# Patient Record
Sex: MALE | Race: WHITE
[De-identification: names, ages, dates, MRNs, and addresses within clinical notes are randomized per-mention and may not be internally consistent; named-entity substitution may affect disease eponyms.]

---

## 2019-07-31 ENCOUNTER — TRANSCRIPTION ENCOUNTER (OUTPATIENT)
Age: 44
End: 2019-07-31

## 2020-04-28 ENCOUNTER — TRANSCRIPTION ENCOUNTER (OUTPATIENT)
Age: 45
End: 2020-04-28

## 2020-05-14 ENCOUNTER — TRANSCRIPTION ENCOUNTER (OUTPATIENT)
Age: 45
End: 2020-05-14

## 2021-11-05 ENCOUNTER — APPOINTMENT (OUTPATIENT)
Dept: FAMILY MEDICINE | Facility: CLINIC | Age: 46
End: 2021-11-05
Payer: COMMERCIAL

## 2021-11-05 ENCOUNTER — TRANSCRIPTION ENCOUNTER (OUTPATIENT)
Age: 46
End: 2021-11-05

## 2021-11-05 VITALS
HEART RATE: 79 BPM | WEIGHT: 255 LBS | HEIGHT: 69 IN | OXYGEN SATURATION: 98 % | SYSTOLIC BLOOD PRESSURE: 140 MMHG | TEMPERATURE: 98.2 F | BODY MASS INDEX: 37.77 KG/M2 | DIASTOLIC BLOOD PRESSURE: 94 MMHG

## 2021-11-05 DIAGNOSIS — G47.30 SLEEP APNEA, UNSPECIFIED: ICD-10-CM

## 2021-11-05 DIAGNOSIS — Z78.9 OTHER SPECIFIED HEALTH STATUS: ICD-10-CM

## 2021-11-05 DIAGNOSIS — Z00.00 ENCOUNTER FOR GENERAL ADULT MEDICAL EXAMINATION W/OUT ABNORMAL FINDINGS: ICD-10-CM

## 2021-11-05 DIAGNOSIS — Z23 ENCOUNTER FOR IMMUNIZATION: ICD-10-CM

## 2021-11-05 PROCEDURE — 90686 IIV4 VACC NO PRSV 0.5 ML IM: CPT

## 2021-11-05 PROCEDURE — 99386 PREV VISIT NEW AGE 40-64: CPT | Mod: 25

## 2021-11-05 PROCEDURE — G0008: CPT

## 2021-11-05 NOTE — HEALTH RISK ASSESSMENT
[Excellent] : ~his/her~  mood as  excellent [] : No [No] : No [No falls in past year] : Patient reported no falls in the past year [0] : 2) Feeling down, depressed, or hopeless: Not at all (0) [PHQ-2 Negative - No further assessment needed] : PHQ-2 Negative - No further assessment needed [[id number]] : 0 [With Significant Other] : lives with significant other [Employed] : employed [] :  [# Of Children ___] : has [unfilled] children [Fully functional (bathing, dressing, toileting, transferring, walking, feeding)] : Fully functional (bathing, dressing, toileting, transferring, walking, feeding) [Fully functional (using the telephone, shopping, preparing meals, housekeeping, doing laundry, using] : Fully functional and needs no help or supervision to perform IADLs (using the telephone, shopping, preparing meals, housekeeping, doing laundry, using transportation, managing medications and managing finances) [de-identified] :

## 2021-11-05 NOTE — HISTORY OF PRESENT ILLNESS
[de-identified] : 46 year old male here to establish care and for a full physical examination. The patients complete medical, family and social history was documented and reviewed with the patient.  The patients medications were identified and also reviewed with the patient as well as any allergies to any medications. All active and previous medical problems were identified and discussed with the patient.  Any new problems were documented. Patient is feeling well today.\par

## 2021-11-06 DIAGNOSIS — E78.00 PURE HYPERCHOLESTEROLEMIA, UNSPECIFIED: ICD-10-CM

## 2021-11-06 DIAGNOSIS — E55.9 VITAMIN D DEFICIENCY, UNSPECIFIED: ICD-10-CM

## 2021-11-06 DIAGNOSIS — E11.9 TYPE 2 DIABETES MELLITUS W/OUT COMPLICATIONS: ICD-10-CM

## 2021-11-06 LAB
25(OH)D3 SERPL-MCNC: 18.3 NG/ML
ALBUMIN SERPL ELPH-MCNC: 4.5 G/DL
ALP BLD-CCNC: 76 U/L
ALT SERPL-CCNC: 53 U/L
ANION GAP SERPL CALC-SCNC: 15 MMOL/L
APPEARANCE: CLEAR
AST SERPL-CCNC: 25 U/L
BASOPHILS # BLD AUTO: 0.05 K/UL
BASOPHILS NFR BLD AUTO: 0.7 %
BILIRUB SERPL-MCNC: 0.4 MG/DL
BILIRUBIN URINE: NEGATIVE
BLOOD URINE: NEGATIVE
BUN SERPL-MCNC: 16 MG/DL
CALCIUM SERPL-MCNC: 9.9 MG/DL
CHLORIDE SERPL-SCNC: 97 MMOL/L
CHOLEST SERPL-MCNC: 261 MG/DL
CO2 SERPL-SCNC: 22 MMOL/L
COLOR: COLORLESS
CREAT SERPL-MCNC: 1.01 MG/DL
EOSINOPHIL # BLD AUTO: 0.26 K/UL
EOSINOPHIL NFR BLD AUTO: 3.9 %
GLUCOSE QUALITATIVE U: ABNORMAL
GLUCOSE SERPL-MCNC: 485 MG/DL
HCT VFR BLD CALC: 48.4 %
HDLC SERPL-MCNC: 46 MG/DL
HGB BLD-MCNC: 16.3 G/DL
IMM GRANULOCYTES NFR BLD AUTO: 0.6 %
KETONES URINE: NORMAL
LDLC SERPL CALC-MCNC: 154 MG/DL
LEUKOCYTE ESTERASE URINE: NEGATIVE
LYMPHOCYTES # BLD AUTO: 1.91 K/UL
LYMPHOCYTES NFR BLD AUTO: 28.4 %
MAN DIFF?: NORMAL
MCHC RBC-ENTMCNC: 28.5 PG
MCHC RBC-ENTMCNC: 33.7 GM/DL
MCV RBC AUTO: 84.8 FL
MONOCYTES # BLD AUTO: 0.42 K/UL
MONOCYTES NFR BLD AUTO: 6.2 %
NEUTROPHILS # BLD AUTO: 4.05 K/UL
NEUTROPHILS NFR BLD AUTO: 60.2 %
NITRITE URINE: NEGATIVE
NONHDLC SERPL-MCNC: 215 MG/DL
PH URINE: 5.5
PLATELET # BLD AUTO: 267 K/UL
POTASSIUM SERPL-SCNC: 4.6 MMOL/L
PROT SERPL-MCNC: 7.1 G/DL
PROTEIN URINE: NEGATIVE
RBC # BLD: 5.71 M/UL
RBC # FLD: 12.6 %
SODIUM SERPL-SCNC: 134 MMOL/L
SPECIFIC GRAVITY URINE: 1.04
TRIGL SERPL-MCNC: 303 MG/DL
TSH SERPL-ACNC: 1 UIU/ML
UROBILINOGEN URINE: NORMAL
WBC # FLD AUTO: 6.73 K/UL

## 2021-11-06 RX ORDER — ROSUVASTATIN CALCIUM 10 MG/1
10 TABLET, FILM COATED ORAL
Qty: 90 | Refills: 1 | Status: ACTIVE | COMMUNITY
Start: 2021-11-06

## 2021-11-06 RX ORDER — ERGOCALCIFEROL 1.25 MG/1
1.25 MG CAPSULE, LIQUID FILLED ORAL
Qty: 12 | Refills: 3 | Status: ACTIVE | COMMUNITY
Start: 2021-11-06

## 2021-11-07 LAB
ESTIMATED AVERAGE GLUCOSE: 321 MG/DL
HBA1C MFR BLD HPLC: 12.8 %

## 2022-01-03 ENCOUNTER — RX RENEWAL (OUTPATIENT)
Age: 47
End: 2022-01-03

## 2022-01-04 ENCOUNTER — APPOINTMENT (OUTPATIENT)
Dept: FAMILY MEDICINE | Facility: CLINIC | Age: 47
End: 2022-01-04

## 2022-01-27 RX ORDER — SITAGLIPTIN 100 MG/1
100 TABLET, FILM COATED ORAL
Qty: 30 | Refills: 0 | Status: ACTIVE | COMMUNITY
Start: 2021-11-07 | End: 1900-01-01

## 2022-01-27 RX ORDER — METFORMIN ER 500 MG 500 MG/1
500 TABLET ORAL
Qty: 120 | Refills: 0 | Status: ACTIVE | COMMUNITY
Start: 2021-11-06 | End: 1900-01-01

## 2022-09-03 ENCOUNTER — NON-APPOINTMENT (OUTPATIENT)
Age: 47
End: 2022-09-03

## 2025-05-02 NOTE — ASSESSMENT
37 year old male with PMH of morbid obesity, BEA on CPAP, pAFIB (not on AC), HTN, and BL papilledema attributed to pseudotumor cerebri; who initially presented to  on 2/24 with SOB and BL LE edema. Found to have URI with progressive SOB and multifocal PNA on imaging. His hospital course was complicated by worsening respiratory distress and increased 02 demands secondary to secretions (requiring multiple intubation attempts) and eventual MICU admission. While in MICU, he 02 requirements continued despite optimal vent management. Concern noted for progressive ARDS, unable to prone given morbid obesity, and ultimately cannulated for vvECMO on 2/25 and transferred to [facility] for further management on 2/26.  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 Patient now admitted for a multidisciplinary rehab program. 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  (present on admission)   - Gait Instability, ADL impairments and Functional impairments: start Comprehensive Rehab Program of PT/OT  - 3 hours a day, 5 days a week   - PRN: Nebulizer QID     BEA on BIPAP  --via nasal canula qhs    #Sacral Osteomyelitis  - Zosyn TID - last dose evening of 5/11/25    #Paroxysmal AFIB  - Eliquis 5mg BID     #Calf Tenderness  - BL LE doppler negative     #L hip pain  - Await L hip Xray     #HTN  - Amlodipine 10mg dialy     #Sleep/Mood  - Melatonin 3mg at HS   - Quetiapine 25mg at HS     #Skin  - LUQ surgical wound previous site of PEG tube and right groin- Clean wound and periwound skin with NS. Pat dry. Cover with small silicone foam with border. Change every other day or prn if soiled     Sacral/gluteal cleft to bilateral buttocks- Irrigate deep cavity and tunneling with Dakins solution 1/4 strength using peribottle or flushes, cleanse wound and satellite ulcerations to b/l buttocks with NS, Dry well. Apply Liquid barrier film to periwound skin. Apply TRIAD barrier paste to isolated ulcer overlying left buttock,  Apply Aquacel hydrofiber sheet to right buttock, pack sacral/gluteal cleft including deep tunnels with Dakins 1/4 strength moistened kerlix, leave at least 2" out at end to ensure full removal of packing with subsequent dressing changes. Cover entire area (sacrum and right buttock) with abdominal pad and tegaderm. Change twice a day and prn if soiled/compromised. Once dressing is in place and perineal care is provided, clean remaining skin with perineal spray, apply TRIAD moisture barrier cream to exposed skin every shift and prn.    Bilateral abdominal pannus, bilateral groin: Cleanse with luke-warm soap and water, dry well. Apply clotrimazole cream daily, followed by Interdry textile sheeting, under intertriginous folds leaving 2 inches exposed at ends to wick, remove to wash & dry affected area, then replace. Individual sheeting may be used for up to 5 days unless soiled. Inspect skin daily.     - L foot wound- betadine to followed by 4x4 gauze, ABD pad, and matilde daily  - R foot wound- mupirocin BID     -Continue to offload pressure; bariatric low airloss support surface, turn and position per protocol with use of fluidized positioning devices, continue incontinence and moisture care per protocol and use of single breathable incontinence pads, continue to offload heels with fluidized positioning devices/Ochoa-Lock positioning device (PS# [id number]). Continue use of bariatric seat cushion (people soft #[id number]) and limit sit time- no more than 2 consecutive hours at any given time.   - Pressure injury/Skin: OOB to Chair, PT/OT   - Consulting plastic surgery - RNs requesting Plastic re-eval for possible wound vac - await CT AP - consider replacing wound vac   - Ammonium lactate to BL LEs     #Neuropathy  - Cymbalta 60mg daily   - Gabapentin 1200mg TID     #Pain Mgmt   - Capsaicin cream to BL feet QID - Avoid use on damaged, broken, or irritated skin. Avoid occlusive dressing or heat   - Lidocaine patch to BL thighs   - PRN: Cyclobenzaprine 5mg TID; Oxycodone 5mg BID; Oxycodone 15mg daily (prior to dressing change); Tylenol 650mg QID     #GI/Bowel Mgmt   - Pantoprazole  - Bisacodyl 10mg at HS   - Miralax   - Naloxegal 25mg daily     #/Bladder Mgmt   #ELLA  #Urinary Retention  - Renvela 800mg TID  - Cr improving   - Bladder scan TID  - Flomax 0.4mg at HS     #FEN --Morbid obesity (BMI > 40).  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 DYSONs Liaison with Family/Providers:    4/30--Girlfriend present at bedside, provided collateral hx and update on patient's function   Discussed treatment plan with her   -----------------------------------------------  OUTPATIENT/FOLLOW UP:    Your Primary Care Provider,   Phone: (   )    -  Fax: (   )    -  Follow Up Time: 1 week    [facility] [place],   [address] [place] [address]  Phone: [phone]  Fax: (   )    -  Follow Up Time:    Dr. Waterhouse  Podiatry  [phone]  Within 1 week   -----------------------------------------------   [FreeTextEntry1] : Patient was counseled on healthy eating habits, on daily exercise and stress relief. All medications and allergies were reviewed with the patient and any adjustments necessary were made. Patient was counseled to try engage in an exercise activity for at least 30 minutes 3-4 times a week.  Patient was advised to eat a diet low in fat and carbohydrates and high in protein, with plenty of vegetables. Patient was advised to try and engage in relaxing activities whenever possible.\par The patients blood was draw in office and will be followed and assessed for any issues.  Patient was told to return to the office if any issues arise.  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

## 2025-05-06 ENCOUNTER — NON-APPOINTMENT (OUTPATIENT)
Age: 50
End: 2025-05-06